# Patient Record
Sex: MALE | HISPANIC OR LATINO
[De-identification: names, ages, dates, MRNs, and addresses within clinical notes are randomized per-mention and may not be internally consistent; named-entity substitution may affect disease eponyms.]

---

## 2022-06-24 ENCOUNTER — APPOINTMENT (OUTPATIENT)
Dept: ORTHOPEDIC SURGERY | Facility: CLINIC | Age: 57
End: 2022-06-24
Payer: MEDICAID

## 2022-06-24 VITALS — BODY MASS INDEX: 29.73 KG/M2 | WEIGHT: 185 LBS | HEIGHT: 66 IN

## 2022-06-24 DIAGNOSIS — Z78.9 OTHER SPECIFIED HEALTH STATUS: ICD-10-CM

## 2022-06-24 DIAGNOSIS — Z63.4 DISAPPEARANCE AND DEATH OF FAMILY MEMBER: ICD-10-CM

## 2022-06-24 PROBLEM — Z00.00 ENCOUNTER FOR PREVENTIVE HEALTH EXAMINATION: Status: ACTIVE | Noted: 2022-06-24

## 2022-06-24 PROCEDURE — 99214 OFFICE O/P EST MOD 30 MIN: CPT | Mod: 25

## 2022-06-24 PROCEDURE — 72040 X-RAY EXAM NECK SPINE 2-3 VW: CPT

## 2022-06-24 PROCEDURE — 73030 X-RAY EXAM OF SHOULDER: CPT | Mod: LT

## 2022-06-24 PROCEDURE — 20611 DRAIN/INJ JOINT/BURSA W/US: CPT

## 2022-06-24 RX ORDER — NAPROXEN 500 MG/1
500 TABLET ORAL
Refills: 0 | Status: ACTIVE | COMMUNITY

## 2022-06-24 SDOH — SOCIAL STABILITY - SOCIAL INSECURITY: DISSAPEARANCE AND DEATH OF FAMILY MEMBER: Z63.4

## 2022-06-24 NOTE — DISCUSSION/SUMMARY
[de-identified] : Follow up with Dr. Perez and Dr. Poole after EMG of left upper extremity for cubital tunnel eval.\par fu with me in 6 wks for rpt eval\par \par Cortisone Shoulder Injection - LEFT \par The risks, benefits, and alternatives to cortisone injection were explained in full to the patient.  Risks outlined include but are not limited to infection, sepsis, bleeding, scarring, skin discoloration, temporary increase in pain, syncopal episode, failure to resolve symptoms, allergic reaction, symptom recurrence, and elevation of blood sugar in diabetics.  Patient understood the risks.  All questions were answered.  After discussion of options, patient requested an injection.  Oral informed consent was obtained and sterile prep was done of the injection site.  A mixture of 40mg of Kenalog, 3cc of 1% Lidocaine was sterilely prepared by me.  Sterile technique was used to introduce the mixture into the left shoulder. Ultrasound was used for proper needle placement.  Patient tolerated the procedure well.  Advised to ice the injection site this evening. \par \par -----------------------------------------------\par Home Exercise\par The patient is instructed on a home exercise program.\par \par MELI REAGAN Acting as a Scribe for Dr. Thomas\par I, Meli Reagan, attest that this documentation has been prepared under the direction and in the presence of Provider Abundio Thomas MD.\par \par Activity Modification\par The patient was advised to modify their activities.\par \par Dx / Natural History\par The patient was advised of the diagnosis.  The natural history of the pathology was explained in full to the patient in layman's terms.  Several different treatment options were discussed and explained in full to the patient including the risks and benefits of both surgical and non-surgical treatments.  All questions and concerns were answered.\par \par Pain Guide Activities\par The patient was advised to let pain guide the gradual advancement of activities.\par \par RICE\par I explained to the patient that rest, ice, compression, and elevation would benefit them.  They may return to activity after follow-up or when they no longer have any pain.

## 2022-06-24 NOTE — PHYSICAL EXAM
[de-identified] : Neurologic: normal coordination, normal DTR UE/LE , normal sensation, normal mood and affect, orientated and able to communicate. \par Skin: normal skin, no rash, no ulcers and no lesions. \par Lymphatic: no obvious lymphadenopathy in areas examined. \par Constitutional: well developed and well nourished. \par Cardiovascular: peripheral vascular exam is grossly normal. \par Pulmonary: no respiratory distress, lungs clear to auscultation bilaterally. \par Abdomen: normal bowel sounds, non-tender, no HSM and no mass. \par \par Left Shoulder: Positive Neer test\par Positive Buck test \par Positive Impingement test\par Supraspinatus strength: 4-/5\par +Dilma sign\par ROM: Full motion both active and passive.\par \par C-Spine X-ray 3-view: Severe midlevel degenerative disk disease\par Shoulder Left X-Ray 2-view: Unremarkable\par \par Left Elbow: +Tinels and +Phalen's for cubital tunnel.

## 2022-06-24 NOTE — HISTORY OF PRESENT ILLNESS
[de-identified] : \par The patient is a 55 year old R hand dominant male who presents today complaining of left shoulder pain radiating to hand\par Date of Injury/Onset: 3/2021\par Pain: At Rest: 7/10 \par With Activity: 7/10 \par Mechanism of injury: shoveling snow\par This is not a Work Related Injury being treated under Worker's Compensation.\par This is not an athletic injury occurring associated with an interscholastic or organized sports team.\par Quality of symptoms: sharp shooting pain, radiating, tightness\par Improves with: rest \par Worse with: overuse \par Treatment/Imaging/Studies Since Last Visit: steroid injection \par Reports Available For Review Today: _\par Out of work/sport: _, since _\par School/Sport/Position/Occupation:_\par Change since last visit: \par Additional Information: None

## 2022-08-09 ENCOUNTER — APPOINTMENT (OUTPATIENT)
Dept: ORTHOPEDIC SURGERY | Facility: CLINIC | Age: 57
End: 2022-08-09

## 2022-08-09 VITALS — BODY MASS INDEX: 29.73 KG/M2 | HEIGHT: 66 IN | WEIGHT: 185 LBS

## 2022-08-09 DIAGNOSIS — M79.18 MYALGIA, OTHER SITE: ICD-10-CM

## 2022-08-09 PROCEDURE — 99214 OFFICE O/P EST MOD 30 MIN: CPT

## 2022-08-09 NOTE — PHYSICAL EXAM
[de-identified] : Neurologic: normal coordination, normal DTR UE/LE , normal sensation, normal mood and affect, orientated and able to communicate. \par Skin: normal skin, no rash, no ulcers and no lesions. \par Lymphatic: no obvious lymphadenopathy in areas examined. \par Constitutional: well developed and well nourished. \par Cardiovascular: peripheral vascular exam is grossly normal. \par Pulmonary: no respiratory distress, lungs clear to auscultation bilaterally. \par Abdomen: normal bowel sounds, non-tender, no HSM and no mass. \par \par Left Shoulder: Positive Neer test\par Positive Buck test \par Positive Impingement test\par Supraspinatus strength: 4-/5\par +Dilma sign\par ROM: Full motion both active and passive.\par \par C-Spine X-ray 3-view: Severe midlevel degenerative disk disease\par Shoulder Left X-Ray 2-view: Unremarkable\par \par Left Elbow: +Tinels and +Phalen's for cubital tunnel.

## 2022-08-09 NOTE — HISTORY OF PRESENT ILLNESS
[de-identified] : The patient is a 55 year old R hand dominant male who presents today complaining of left shoulder pain radiating to hand\par Date of Injury/Onset: 3/2021\par Pain: At Rest: 5/10 \par With Activity: 7/10 \par Mechanism of injury: shoveling snow\par This is not a Work Related Injury being treated under Worker's Compensation.\par This is not an athletic injury occurring associated with an interscholastic or organized sports team.\par Quality of symptoms: sharp shooting pain, radiating, tightness\par Improves with: rest \par Worse with: overuse, sleeping on the involved side \par Treatment/Imaging/Studies Since Last Visit: steroid injection, HEP \par Reports Available For Review Today: _\par Out of work/sport: _, since _\par School/Sport/Position/Occupation:_\par Change since last visit: Patient noted "95% improvement" in pain and function since last visit and CSI\par Additional Information: None

## 2022-08-09 NOTE — DISCUSSION/SUMMARY
[de-identified] : Spine service eval for C spine\par EMG\par fu PRN if shoulder pain recurs\par \par \par -----------------------------------------------\par Home Exercise\par The patient is instructed on a home exercise program.\par \par MELI REAGAN Acting as a Scribe for Dr. Thomas\par I, Meli Reagan, attest that this documentation has been prepared under the direction and in the presence of Provider Abundio Thomas MD.\par \par Activity Modification\par The patient was advised to modify their activities.\par \par Dx / Natural History\par The patient was advised of the diagnosis.  The natural history of the pathology was explained in full to the patient in layman's terms.  Several different treatment options were discussed and explained in full to the patient including the risks and benefits of both surgical and non-surgical treatments.  All questions and concerns were answered.\par \par Pain Guide Activities\par The patient was advised to let pain guide the gradual advancement of activities.\par \par RICE\par I explained to the patient that rest, ice, compression, and elevation would benefit them.  They may return to activity after follow-up or when they no longer have any pain.

## 2023-09-06 ENCOUNTER — APPOINTMENT (OUTPATIENT)
Dept: AFTER HOURS CARE | Facility: EMERGENCY ROOM | Age: 58
End: 2023-09-06
Payer: MEDICARE

## 2023-09-06 DIAGNOSIS — U07.1 COVID-19: ICD-10-CM

## 2023-09-06 PROCEDURE — 99204 OFFICE O/P NEW MOD 45 MIN: CPT | Mod: 95

## 2023-09-06 RX ORDER — NIRMATRELVIR AND RITONAVIR 300-100 MG
20 X 150 MG & KIT ORAL
Qty: 1 | Refills: 0 | Status: ACTIVE | COMMUNITY
Start: 2023-09-06 | End: 1900-01-01

## 2023-09-06 NOTE — PLAN
[With new medications prescribed] : Treat in place: with new medications prescribed [FreeTextEntry1] : rx paxlovid Pulse-ox symptomatic care - apap, nsaids, sudafed, etc anticipatory guidance incl quarantine & maintaining up to date on COVID vaccine series indications to seek ED care discussed, incl SOB, SpO2< 94, dehydration, AMS prompt PMD follow up

## 2023-09-06 NOTE — ASSESSMENT
[FreeTextEntry1] : Acute symptomatic COVID infection in this well appearing, vaccinated 57 year-old patient w/comorbid medical conditions. At this point there is no sign of acute decompensation incl SOB, critical dehydration, or AMS. MABs are unfortunately not effective against the current predominant Omicron variants. Will consider oral antivirals, being mindful of any medication interactions.

## 2023-09-06 NOTE — PHYSICAL EXAM
[de-identified] : GENERAL: no acute distress, well-hydrated, well-appearing, nontoxic HEAD: normocephalic EYES: no scleral icterus, gaze conjugate NECK: trachea midline, Full ROM RESP: no resp distress, no supraclav rtrx, no stridor, normal 1:E ratio ABD: nondistended MSK: no gross deformity NEURO: alert & fully oriented SKIN: no rash PSYCH: cooperative

## 2023-09-06 NOTE — HISTORY OF PRESENT ILLNESS
[Home] : at home, [unfilled] , at the time of the visit. [Other Location: e.g. Home (Enter Location, City,State)___] : at [unfilled] [Verbal consent obtained from patient] : the patient, [unfilled] [FreeTextEntry8] : 57y M hx arthritis on prn diclofenac/tizanidine. just tested positive for COVID, Sx started today: arthralgia, fingertip discomfort, congestion. Vaccinated and fully boosted. No kidney or liver disease. No SOB or cough.  [Spouse] : spouse